# Patient Record
Sex: MALE | Race: WHITE | NOT HISPANIC OR LATINO | Employment: FULL TIME | URBAN - METROPOLITAN AREA
[De-identification: names, ages, dates, MRNs, and addresses within clinical notes are randomized per-mention and may not be internally consistent; named-entity substitution may affect disease eponyms.]

---

## 2023-06-13 ENCOUNTER — OFFICE VISIT (OUTPATIENT)
Dept: URGENT CARE | Facility: CLINIC | Age: 28
End: 2023-06-13
Payer: COMMERCIAL

## 2023-06-13 VITALS
SYSTOLIC BLOOD PRESSURE: 118 MMHG | BODY MASS INDEX: 24.5 KG/M2 | OXYGEN SATURATION: 98 % | TEMPERATURE: 97.7 F | WEIGHT: 175 LBS | HEIGHT: 71 IN | RESPIRATION RATE: 16 BRPM | DIASTOLIC BLOOD PRESSURE: 68 MMHG | HEART RATE: 66 BPM

## 2023-06-13 DIAGNOSIS — L03.313 CELLULITIS OF CHEST WALL: Primary | ICD-10-CM

## 2023-06-13 PROCEDURE — G0382 LEV 3 HOSP TYPE B ED VISIT: HCPCS | Performed by: NURSE PRACTITIONER

## 2023-06-13 RX ORDER — CEPHALEXIN 500 MG/1
500 CAPSULE ORAL EVERY 12 HOURS SCHEDULED
Qty: 20 CAPSULE | Refills: 0 | Status: SHIPPED | OUTPATIENT
Start: 2023-06-13 | End: 2023-06-23

## 2023-06-13 NOTE — PROGRESS NOTES
[address]        NAME: Jamshid Redman is a 32 y o  male  : 1995    MRN: [id number]  DATE: 2023  TIME: 11:30 AM    Assessment and Plan   Cellulitis of chest wall [L03 [id number]]  1  Cellulitis of chest wall  cephalexin (KEFLEX) 500 mg capsule          Acute symptomatic new onset following rash that presented following hot tub use with pH and chemicals off balance  Patient now with swelling redness warmth around left nipple approximately where one of the papules were noted  Will recommend patient start Keflex twice daily x10 days  Educated on side effects proper use of medication, signs and symptoms of systemic infection, monitoring site of redness for spreading, follow-up with PCP 3 to 5 days and proceed to ED with systemic infection symptoms or worsening of symptoms  Patient Instructions     Will recommend start Keflex twice daily x10 days, monitor for spreading of redness, signs and symptoms of systemic infection,  Follow up with PCP in 3-5 days  Proceed to  ER if symptoms worsen  Chief Complaint     Chief Complaint   Patient presents with   • Rash     Pt reports multiple body site skin rash with onset after using a hot tub on Saturday that he believes the ph levels were not regulated  C/o pain and redness  Managing symptoms with benadryl  History of Present Illness       Patient is a 26-year-old male reports that was in the hot tub this past weekend and the pH and chemical levels were off  Patient reports now with rash 1 noted papule under the right armpit  Patient does have redness swelling or warmth around the left nipple  Patient reports that it started as a little palpable now has spread  Has been taking Benadryl without relief      Review of Systems   Review of Systems   Constitutional: Negative for activity change, appetite change, chills, fatigue and fever  HENT: Negative for congestion, rhinorrhea, sinus pressure, sinus pain and sore throat      Respiratory: "Negative for cough, chest tightness and shortness of breath  Gastrointestinal: Negative for constipation, diarrhea, nausea and vomiting  Musculoskeletal: Negative for myalgias  Skin: Positive for color change and rash  Negative for pallor  Neurological: Negative for dizziness, syncope, weakness, light-headedness and headaches  Hematological: Negative for adenopathy  Psychiatric/Behavioral: Negative for agitation and confusion  Current Medications       Current Outpatient Medications:   •  cephalexin (KEFLEX) 500 mg capsule, Take 1 capsule (500 mg total) by mouth every 12 (twelve) hours for 10 days, Disp: 20 capsule, Rfl: 0    Current Allergies     Allergies as of 06/13/2023   • (No Known Allergies)            The following portions of the patient's history were reviewed and updated as appropriate: allergies, current medications, past family history, past medical history, past social history, past surgical history and problem list      History reviewed  No pertinent past medical history  History reviewed  No pertinent surgical history  History reviewed  No pertinent family history  Medications have been verified  Objective   /68 (BP Location: Left arm, Patient Position: Sitting)   Pulse 66   Temp 97 7 °F (36 5 °C)   Resp 16   Ht 5' 11\" (1 803 m)   Wt 79 4 kg (175 lb)   SpO2 98%   BMI 24 41 kg/m²   No LMP for male patient  Physical Exam     Physical Exam  Vitals and nursing note reviewed  Constitutional:       General: He is not in acute distress  Appearance: Normal appearance  He is not ill-appearing, toxic-appearing or diaphoretic  HENT:      Head: Normocephalic and atraumatic  Nose: No congestion or rhinorrhea  Mouth/Throat:      Mouth: Mucous membranes are moist    Eyes:      General: No scleral icterus  Right eye: No discharge  Left eye: No discharge        Conjunctiva/sclera: Conjunctivae normal    Cardiovascular:      Rate " and Rhythm: Normal rate and regular rhythm  Pulmonary:      Effort: Pulmonary effort is normal  No respiratory distress  Breath sounds: No stridor  No wheezing, rhonchi or rales  Musculoskeletal:         General: Normal range of motion  Cervical back: Normal range of motion  Skin:     General: Skin is dry  Findings: Erythema and rash present  Neurological:      Mental Status: He is alert and oriented to person, place, and time  Psychiatric:         Mood and Affect: Mood normal          Behavior: Behavior normal          Thought Content:  Thought content normal          Judgment: Judgment normal